# Patient Record
Sex: MALE | Race: WHITE | NOT HISPANIC OR LATINO | URBAN - NONMETROPOLITAN AREA
[De-identification: names, ages, dates, MRNs, and addresses within clinical notes are randomized per-mention and may not be internally consistent; named-entity substitution may affect disease eponyms.]

---

## 2023-12-20 ENCOUNTER — CLAIMS CREATED FROM THE CLAIM WINDOW (OUTPATIENT)
Dept: URBAN - NONMETROPOLITAN AREA CLINIC 4 | Facility: CLINIC | Age: 73
End: 2023-12-20

## 2023-12-20 ENCOUNTER — CLAIMS CREATED FROM THE CLAIM WINDOW (OUTPATIENT)
Dept: URBAN - NONMETROPOLITAN AREA CLINIC 4 | Facility: CLINIC | Age: 73
End: 2023-12-20
Payer: OTHER GOVERNMENT

## 2023-12-20 ENCOUNTER — DASHBOARD ENCOUNTERS (OUTPATIENT)
Age: 73
End: 2023-12-20

## 2023-12-20 ENCOUNTER — OFFICE VISIT (OUTPATIENT)
Dept: URBAN - NONMETROPOLITAN AREA CLINIC 4 | Facility: CLINIC | Age: 73
End: 2023-12-20

## 2023-12-20 VITALS
HEIGHT: 70 IN | DIASTOLIC BLOOD PRESSURE: 71 MMHG | HEART RATE: 62 BPM | SYSTOLIC BLOOD PRESSURE: 133 MMHG | WEIGHT: 190 LBS | BODY MASS INDEX: 27.2 KG/M2 | TEMPERATURE: 97.5 F

## 2023-12-20 DIAGNOSIS — E11.9 CONTROLLED TYPE 2 DIABETES MELLITUS WITHOUT COMPLICATION, WITHOUT LONG-TERM CURRENT USE OF INSULIN: ICD-10-CM

## 2023-12-20 DIAGNOSIS — K21.9 GERD: ICD-10-CM

## 2023-12-20 DIAGNOSIS — Z12.11 SCREENING FOR COLON CANCER: ICD-10-CM

## 2023-12-20 PROBLEM — 313436004: Status: ACTIVE | Noted: 2023-12-20

## 2023-12-20 PROCEDURE — 99204 OFFICE O/P NEW MOD 45 MIN: CPT | Performed by: PHYSICIAN ASSISTANT

## 2023-12-20 RX ORDER — PANTOPRAZOLE SODIUM 40 MG/1
1 TABLET TABLET, DELAYED RELEASE ORAL ONCE A DAY
Qty: 30 | OUTPATIENT
Start: 2023-12-20

## 2023-12-20 NOTE — HPI-TODAY'S VISIT:
Patient elicits the following symptoms: burning in epigastrum with eating intermittently, previous GERD on Nexium, with no further use with change in diet with DM II Duration of symptoms: several months Location of symptoms: epigastric area Associated symptoms: no associated dysphagia, nor weight loss Prior over the counter or prescription medications: Nexium, which he has stopped with DM II mgmt  Any recent weight changes: none Any recent changes in medications: none Any recent changes in diet: none Any past history of gastric/esophageal ulcers or Barretts esophagus: no previous EGD  Last EGD: never

## 2024-01-10 ENCOUNTER — TELEPHONE ENCOUNTER (OUTPATIENT)
Dept: URBAN - NONMETROPOLITAN AREA CLINIC 4 | Facility: CLINIC | Age: 74
End: 2024-01-10

## 2024-01-10 RX ORDER — PANTOPRAZOLE SODIUM 40 MG/1
1 TABLET TABLET, DELAYED RELEASE ORAL ONCE A DAY
Qty: 30 | Refills: 5 | OUTPATIENT
Start: 2024-01-10

## 2024-05-31 ENCOUNTER — ERX REFILL RESPONSE (OUTPATIENT)
Dept: URBAN - NONMETROPOLITAN AREA CLINIC 4 | Facility: CLINIC | Age: 74
End: 2024-05-31

## 2024-05-31 RX ORDER — PANTOPRAZOLE SODIUM DELAYED-RELEASE 40 MG/1
TAKE ONE TABLET BY MOUTH ONE TIME DAILY TABLET ORAL
Qty: 30 TABLET | Refills: 5 | OUTPATIENT

## 2024-05-31 RX ORDER — PANTOPRAZOLE SODIUM 40 MG/1
1 TABLET TABLET, DELAYED RELEASE ORAL ONCE A DAY
Qty: 30 | Refills: 5 | OUTPATIENT

## 2024-12-09 ENCOUNTER — ERX REFILL RESPONSE (OUTPATIENT)
Dept: URBAN - NONMETROPOLITAN AREA CLINIC 4 | Facility: CLINIC | Age: 74
End: 2024-12-09

## 2024-12-09 RX ORDER — PANTOPRAZOLE SODIUM 40 MG/1
TAKE ONE TABLET BY MOUTH ONE TIME DAILY TABLET, DELAYED RELEASE ORAL
Qty: 30 TABLET | Refills: 5 | OUTPATIENT

## 2025-06-17 ENCOUNTER — ERX REFILL RESPONSE (OUTPATIENT)
Dept: URBAN - NONMETROPOLITAN AREA CLINIC 4 | Facility: CLINIC | Age: 75
End: 2025-06-17

## 2025-06-17 RX ORDER — PANTOPRAZOLE SODIUM 40 MG/1
TAKE ONE TABLET BY MOUTH ONE TIME DAILY TABLET, DELAYED RELEASE ORAL
Qty: 30 TABLET | Refills: 5 | OUTPATIENT